# Patient Record
Sex: FEMALE | Race: OTHER | Employment: UNEMPLOYED | URBAN - METROPOLITAN AREA
[De-identification: names, ages, dates, MRNs, and addresses within clinical notes are randomized per-mention and may not be internally consistent; named-entity substitution may affect disease eponyms.]

---

## 2017-02-27 ENCOUNTER — HOSPITAL ENCOUNTER (OUTPATIENT)
Dept: MAMMOGRAPHY | Age: 37
Discharge: HOME | End: 2017-02-27
Attending: INTERNAL MEDICINE
Payer: SUBSIDIZED

## 2017-02-27 DIAGNOSIS — N63.0 BREAST NODULE: ICD-10-CM

## 2017-02-27 PROCEDURE — 77066 DX MAMMO INCL CAD BI: CPT

## 2017-02-27 PROCEDURE — 76642 ULTRASOUND BREAST LIMITED: CPT

## 2017-11-03 ENCOUNTER — HOSPITAL ENCOUNTER (OUTPATIENT)
Dept: LAB | Age: 37
Discharge: HOME | End: 2017-11-03

## 2017-11-03 PROCEDURE — 88175 CYTOPATH C/V AUTO FLUID REDO: CPT | Performed by: INTERNAL MEDICINE

## 2017-11-03 PROCEDURE — 87624 HPV HI-RISK TYP POOLED RSLT: CPT | Performed by: INTERNAL MEDICINE

## 2017-11-08 ENCOUNTER — HOSPITAL ENCOUNTER (OUTPATIENT)
Dept: ULTRASOUND IMAGING | Age: 37
Discharge: HOME | End: 2017-11-08
Payer: SUBSIDIZED

## 2017-11-08 DIAGNOSIS — R10.32 LEFT LOWER QUADRANT PAIN: ICD-10-CM

## 2017-11-08 PROCEDURE — 76856 US EXAM PELVIC COMPLETE: CPT

## 2017-11-08 PROCEDURE — 76830 TRANSVAGINAL US NON-OB: CPT

## 2019-05-15 ENCOUNTER — HOSPITAL ENCOUNTER (OUTPATIENT)
Dept: LAB | Age: 39
Discharge: HOME | End: 2019-05-15

## 2019-05-15 PROCEDURE — 80053 COMPREHEN METABOLIC PANEL: CPT

## 2019-05-15 PROCEDURE — 85025 COMPLETE CBC W/AUTO DIFF WBC: CPT

## 2019-05-15 PROCEDURE — 80061 LIPID PANEL: CPT

## 2019-05-16 LAB
ALBUMIN SERPL-MCNC: 4.1 G/DL (ref 3.5–5)
ALBUMIN/GLOB SERPL: 1.1 {RATIO} (ref 1.1–2.2)
ALP SERPL-CCNC: 63 U/L (ref 45–117)
ALT SERPL-CCNC: 40 U/L (ref 12–78)
ANION GAP SERPL CALC-SCNC: 8 MMOL/L (ref 5–15)
AST SERPL-CCNC: 29 U/L (ref 15–37)
BASOPHILS # BLD: 0.1 K/UL (ref 0–0.1)
BASOPHILS NFR BLD: 1 % (ref 0–1)
BILIRUB SERPL-MCNC: 0.5 MG/DL (ref 0.2–1)
BUN SERPL-MCNC: 10 MG/DL (ref 6–20)
BUN/CREAT SERPL: 15 (ref 12–20)
CALCIUM SERPL-MCNC: 9 MG/DL (ref 8.5–10.1)
CHLORIDE SERPL-SCNC: 104 MMOL/L (ref 97–108)
CHOLEST SERPL-MCNC: 233 MG/DL
CO2 SERPL-SCNC: 26 MMOL/L (ref 21–32)
CREAT SERPL-MCNC: 0.68 MG/DL (ref 0.55–1.02)
DIFFERENTIAL METHOD BLD: NORMAL
EOSINOPHIL # BLD: 0.2 K/UL (ref 0–0.4)
EOSINOPHIL NFR BLD: 3 % (ref 0–7)
ERYTHROCYTE [DISTWIDTH] IN BLOOD BY AUTOMATED COUNT: 12.8 % (ref 11.5–14.5)
GLOBULIN SER CALC-MCNC: 3.8 G/DL (ref 2–4)
GLUCOSE SERPL-MCNC: 92 MG/DL (ref 65–100)
HCT VFR BLD AUTO: 41.3 % (ref 35–47)
HDLC SERPL-MCNC: 54 MG/DL
HDLC SERPL: 4.3 {RATIO} (ref 0–5)
HGB BLD-MCNC: 13.5 G/DL (ref 11.5–16)
IMM GRANULOCYTES # BLD AUTO: 0 K/UL (ref 0–0.04)
IMM GRANULOCYTES NFR BLD AUTO: 0 % (ref 0–0.5)
LDLC SERPL CALC-MCNC: 146.2 MG/DL (ref 0–100)
LIPID PROFILE,FLP: ABNORMAL
LYMPHOCYTES # BLD: 2.4 K/UL (ref 0.8–3.5)
LYMPHOCYTES NFR BLD: 33 % (ref 12–49)
MCH RBC QN AUTO: 31.5 PG (ref 26–34)
MCHC RBC AUTO-ENTMCNC: 32.7 G/DL (ref 30–36.5)
MCV RBC AUTO: 96.5 FL (ref 80–99)
MONOCYTES # BLD: 0.6 K/UL (ref 0–1)
MONOCYTES NFR BLD: 8 % (ref 5–13)
NEUTS SEG # BLD: 4 K/UL (ref 1.8–8)
NEUTS SEG NFR BLD: 55 % (ref 32–75)
NRBC # BLD: 0 K/UL (ref 0–0.01)
NRBC BLD-RTO: 0 PER 100 WBC
PLATELET # BLD AUTO: 274 K/UL (ref 150–400)
PMV BLD AUTO: 9.8 FL (ref 8.9–12.9)
POTASSIUM SERPL-SCNC: 4 MMOL/L (ref 3.5–5.1)
PROT SERPL-MCNC: 7.9 G/DL (ref 6.4–8.2)
RBC # BLD AUTO: 4.28 M/UL (ref 3.8–5.2)
SODIUM SERPL-SCNC: 138 MMOL/L (ref 136–145)
TRIGL SERPL-MCNC: 164 MG/DL (ref ?–150)
VLDLC SERPL CALC-MCNC: 32.8 MG/DL
WBC # BLD AUTO: 7.3 K/UL (ref 3.6–11)

## 2021-11-16 ENCOUNTER — HOSPITAL ENCOUNTER (OUTPATIENT)
Age: 41
Setting detail: OBSERVATION
LOS: 1 days | Discharge: HOME | End: 2021-11-17
Attending: EMERGENCY MEDICINE | Admitting: FAMILY MEDICINE

## 2021-11-16 DIAGNOSIS — K85.90 ACUTE PANCREATITIS, UNSPECIFIED COMPLICATION STATUS, UNSPECIFIED PANCREATITIS TYPE: Primary | ICD-10-CM

## 2021-11-16 LAB
ALBUMIN SERPL-MCNC: 4 G/DL (ref 3.5–5)
ALBUMIN/GLOB SERPL: 1 {RATIO} (ref 1.1–2.2)
ALP SERPL-CCNC: 84 U/L (ref 45–117)
ALT SERPL-CCNC: 69 U/L (ref 12–78)
ANION GAP SERPL CALC-SCNC: 10 MMOL/L (ref 5–15)
AST SERPL-CCNC: 70 U/L (ref 15–37)
BASOPHILS # BLD: 0.1 K/UL (ref 0–0.1)
BASOPHILS NFR BLD: 0 % (ref 0–1)
BILIRUB SERPL-MCNC: 2.2 MG/DL (ref 0.2–1)
BUN SERPL-MCNC: 6 MG/DL (ref 6–20)
BUN/CREAT SERPL: 9 (ref 12–20)
CALCIUM SERPL-MCNC: 9.8 MG/DL (ref 8.5–10.1)
CHLORIDE SERPL-SCNC: 100 MMOL/L (ref 97–108)
CO2 SERPL-SCNC: 23 MMOL/L (ref 21–32)
COMMENT, HOLDF: NORMAL
CREAT SERPL-MCNC: 0.68 MG/DL (ref 0.55–1.02)
D DIMER PPP FEU-MCNC: 7.23 MG/L FEU (ref 0–0.65)
DIFFERENTIAL METHOD BLD: ABNORMAL
EOSINOPHIL # BLD: 0.3 K/UL (ref 0–0.4)
EOSINOPHIL NFR BLD: 2 % (ref 0–7)
ERYTHROCYTE [DISTWIDTH] IN BLOOD BY AUTOMATED COUNT: 12.7 % (ref 11.5–14.5)
GLOBULIN SER CALC-MCNC: 4.2 G/DL (ref 2–4)
GLUCOSE SERPL-MCNC: 141 MG/DL (ref 65–100)
HCT VFR BLD AUTO: 40.2 % (ref 35–47)
HGB BLD-MCNC: 14.2 G/DL (ref 11.5–16)
IMM GRANULOCYTES # BLD AUTO: 0.1 K/UL (ref 0–0.04)
IMM GRANULOCYTES NFR BLD AUTO: 0 % (ref 0–0.5)
LIPASE SERPL-CCNC: 1098 U/L (ref 73–393)
LYMPHOCYTES # BLD: 1.6 K/UL (ref 0.8–3.5)
LYMPHOCYTES NFR BLD: 10 % (ref 12–49)
MCH RBC QN AUTO: 34.2 PG (ref 26–34)
MCHC RBC AUTO-ENTMCNC: 35.3 G/DL (ref 30–36.5)
MCV RBC AUTO: 96.9 FL (ref 80–99)
MONOCYTES # BLD: 1 K/UL (ref 0–1)
MONOCYTES NFR BLD: 6 % (ref 5–13)
NEUTS SEG # BLD: 13.8 K/UL (ref 1.8–8)
NEUTS SEG NFR BLD: 82 % (ref 32–75)
NRBC # BLD: 0 K/UL (ref 0–0.01)
NRBC BLD-RTO: 0 PER 100 WBC
PLATELET # BLD AUTO: 229 K/UL (ref 150–400)
PMV BLD AUTO: 9.6 FL (ref 8.9–12.9)
POTASSIUM SERPL-SCNC: 3.2 MMOL/L (ref 3.5–5.1)
PROT SERPL-MCNC: 8.2 G/DL (ref 6.4–8.2)
RBC # BLD AUTO: 4.15 M/UL (ref 3.8–5.2)
SAMPLES BEING HELD,HOLD: NORMAL
SODIUM SERPL-SCNC: 133 MMOL/L (ref 136–145)
UR CULT HOLD, URHOLD: NORMAL
WBC # BLD AUTO: 16.8 K/UL (ref 3.6–11)

## 2021-11-16 PROCEDURE — 80061 LIPID PANEL: CPT

## 2021-11-16 PROCEDURE — 93005 ELECTROCARDIOGRAM TRACING: CPT

## 2021-11-16 PROCEDURE — 96360 HYDRATION IV INFUSION INIT: CPT

## 2021-11-16 PROCEDURE — 99284 EMERGENCY DEPT VISIT MOD MDM: CPT

## 2021-11-16 PROCEDURE — 83690 ASSAY OF LIPASE: CPT

## 2021-11-16 PROCEDURE — 36415 COLL VENOUS BLD VENIPUNCTURE: CPT

## 2021-11-16 PROCEDURE — 96365 THER/PROPH/DIAG IV INF INIT: CPT

## 2021-11-16 PROCEDURE — 85379 FIBRIN DEGRADATION QUANT: CPT

## 2021-11-16 PROCEDURE — 84484 ASSAY OF TROPONIN QUANT: CPT

## 2021-11-16 PROCEDURE — 96361 HYDRATE IV INFUSION ADD-ON: CPT

## 2021-11-16 PROCEDURE — 83735 ASSAY OF MAGNESIUM: CPT

## 2021-11-16 PROCEDURE — 96376 TX/PRO/DX INJ SAME DRUG ADON: CPT

## 2021-11-16 PROCEDURE — 85025 COMPLETE CBC W/AUTO DIFF WBC: CPT

## 2021-11-16 PROCEDURE — 80053 COMPREHEN METABOLIC PANEL: CPT

## 2021-11-16 PROCEDURE — 81001 URINALYSIS AUTO W/SCOPE: CPT

## 2021-11-16 PROCEDURE — 96372 THER/PROPH/DIAG INJ SC/IM: CPT

## 2021-11-16 RX ORDER — KETOROLAC TROMETHAMINE 30 MG/ML
15 INJECTION, SOLUTION INTRAMUSCULAR; INTRAVENOUS
Status: ACTIVE | OUTPATIENT
Start: 2021-11-16 | End: 2021-11-17

## 2021-11-16 NOTE — Clinical Note
Status[de-identified] INPATIENT [101]   Type of Bed: Medical [8]   Cardiac Monitoring Required?: No   Inpatient Hospitalization Certified Necessary for the Following Reasons: 3. Patient receiving treatment that can only be provided in an inpatient setting (further clarification in H&P documentation)   Admitting Diagnosis: Acute pancreatitis [577. 0. ICD-9-CM]   Admitting Physician: Herb Fung [[id number]]   Attending Physician: Herb Fung [[id number]]   Estimated Length of Stay: 2 Midnights   Discharge Plan[de-identified] Home with Office Follow-up

## 2021-11-17 ENCOUNTER — APPOINTMENT (OUTPATIENT)
Dept: MRI IMAGING | Age: 41
End: 2021-11-17
Attending: NURSE PRACTITIONER

## 2021-11-17 ENCOUNTER — APPOINTMENT (OUTPATIENT)
Dept: CT IMAGING | Age: 41
End: 2021-11-17
Attending: PHYSICIAN ASSISTANT

## 2021-11-17 VITALS
DIASTOLIC BLOOD PRESSURE: 72 MMHG | HEART RATE: 76 BPM | RESPIRATION RATE: 16 BRPM | SYSTOLIC BLOOD PRESSURE: 110 MMHG | TEMPERATURE: 98.1 F | OXYGEN SATURATION: 99 %

## 2021-11-17 PROBLEM — K85.90 ACUTE PANCREATITIS: Status: ACTIVE | Noted: 2021-11-17

## 2021-11-17 PROBLEM — K85.90 PANCREATITIS: Status: ACTIVE | Noted: 2021-11-17

## 2021-11-17 LAB
APPEARANCE UR: CLEAR
BACTERIA URNS QL MICRO: NEGATIVE /HPF
BILIRUB UR QL CFM: NEGATIVE
CHOLEST SERPL-MCNC: 270 MG/DL
COLOR UR: ABNORMAL
EPITH CASTS URNS QL MICRO: ABNORMAL /LPF
GLUCOSE UR STRIP.AUTO-MCNC: NEGATIVE MG/DL
HDLC SERPL-MCNC: 45 MG/DL
HDLC SERPL: 6 {RATIO} (ref 0–5)
HGB UR QL STRIP: ABNORMAL
HYALINE CASTS URNS QL MICRO: ABNORMAL /LPF (ref 0–5)
KETONES UR QL STRIP.AUTO: ABNORMAL MG/DL
LDLC SERPL CALC-MCNC: 195.6 MG/DL (ref 0–100)
LEUKOCYTE ESTERASE UR QL STRIP.AUTO: NEGATIVE
MAGNESIUM SERPL-MCNC: 2.3 MG/DL (ref 1.6–2.4)
NITRITE UR QL STRIP.AUTO: NEGATIVE
PH UR STRIP: 5.5 [PH] (ref 5–8)
PROT UR STRIP-MCNC: 30 MG/DL
RBC #/AREA URNS HPF: ABNORMAL /HPF (ref 0–5)
SP GR UR REFRACTOMETRY: 1.01 (ref 1–1.03)
TRIGL SERPL-MCNC: 147 MG/DL (ref ?–150)
TROPONIN-HIGH SENSITIVITY: 10 NG/L (ref 0–51)
UROBILINOGEN UR QL STRIP.AUTO: 1 EU/DL (ref 0.2–1)
VLDLC SERPL CALC-MCNC: 29.4 MG/DL
WBC URNS QL MICRO: ABNORMAL /HPF (ref 0–4)

## 2021-11-17 PROCEDURE — A9576 INJ PROHANCE MULTIPACK: HCPCS

## 2021-11-17 PROCEDURE — 96361 HYDRATE IV INFUSION ADD-ON: CPT

## 2021-11-17 PROCEDURE — 74177 CT ABD & PELVIS W/CONTRAST: CPT

## 2021-11-17 PROCEDURE — 74183 MRI ABD W/O CNTR FLWD CNTR: CPT

## 2021-11-17 PROCEDURE — G0378 HOSPITAL OBSERVATION PER HR: HCPCS

## 2021-11-17 PROCEDURE — 71275 CT ANGIOGRAPHY CHEST: CPT

## 2021-11-17 PROCEDURE — 99024 POSTOP FOLLOW-UP VISIT: CPT | Performed by: SURGERY

## 2021-11-17 PROCEDURE — 96372 THER/PROPH/DIAG INJ SC/IM: CPT

## 2021-11-17 PROCEDURE — 74011250636 HC RX REV CODE- 250/636: Performed by: HOSPITALIST

## 2021-11-17 PROCEDURE — 96360 HYDRATION IV INFUSION INIT: CPT

## 2021-11-17 PROCEDURE — 74011250636 HC RX REV CODE- 250/636

## 2021-11-17 PROCEDURE — 74011250636 HC RX REV CODE- 250/636: Performed by: PHYSICIAN ASSISTANT

## 2021-11-17 PROCEDURE — 96376 TX/PRO/DX INJ SAME DRUG ADON: CPT

## 2021-11-17 PROCEDURE — 96365 THER/PROPH/DIAG IV INF INIT: CPT

## 2021-11-17 PROCEDURE — 74011000636 HC RX REV CODE- 636: Performed by: EMERGENCY MEDICINE

## 2021-11-17 PROCEDURE — 74011000258 HC RX REV CODE- 258: Performed by: FAMILY MEDICINE

## 2021-11-17 RX ORDER — ENOXAPARIN SODIUM 100 MG/ML
40 INJECTION SUBCUTANEOUS DAILY
Status: DISCONTINUED | OUTPATIENT
Start: 2021-11-17 | End: 2021-11-17 | Stop reason: HOSPADM

## 2021-11-17 RX ORDER — ACETAMINOPHEN 650 MG/1
650 SUPPOSITORY RECTAL
Status: DISCONTINUED | OUTPATIENT
Start: 2021-11-17 | End: 2021-11-17 | Stop reason: HOSPADM

## 2021-11-17 RX ORDER — POTASSIUM CHLORIDE 7.45 MG/ML
10 INJECTION INTRAVENOUS
Status: COMPLETED | OUTPATIENT
Start: 2021-11-17 | End: 2021-11-17

## 2021-11-17 RX ORDER — ONDANSETRON 2 MG/ML
4 INJECTION INTRAMUSCULAR; INTRAVENOUS
Status: DISCONTINUED | OUTPATIENT
Start: 2021-11-17 | End: 2021-11-17 | Stop reason: HOSPADM

## 2021-11-17 RX ORDER — ONDANSETRON 4 MG/1
4 TABLET, ORALLY DISINTEGRATING ORAL
Status: DISCONTINUED | OUTPATIENT
Start: 2021-11-17 | End: 2021-11-17 | Stop reason: HOSPADM

## 2021-11-17 RX ORDER — SODIUM CHLORIDE 0.9 % (FLUSH) 0.9 %
5-40 SYRINGE (ML) INJECTION AS NEEDED
Status: DISCONTINUED | OUTPATIENT
Start: 2021-11-17 | End: 2021-11-17 | Stop reason: HOSPADM

## 2021-11-17 RX ORDER — HYDROMORPHONE HYDROCHLORIDE 1 MG/ML
1 INJECTION, SOLUTION INTRAMUSCULAR; INTRAVENOUS; SUBCUTANEOUS
Status: DISCONTINUED | OUTPATIENT
Start: 2021-11-17 | End: 2021-11-17 | Stop reason: HOSPADM

## 2021-11-17 RX ORDER — AMOXICILLIN 250 MG
1 CAPSULE ORAL DAILY
Status: DISCONTINUED | OUTPATIENT
Start: 2021-11-17 | End: 2021-11-17 | Stop reason: HOSPADM

## 2021-11-17 RX ORDER — ACETAMINOPHEN 325 MG/1
650 TABLET ORAL
Status: DISCONTINUED | OUTPATIENT
Start: 2021-11-17 | End: 2021-11-17 | Stop reason: HOSPADM

## 2021-11-17 RX ORDER — SODIUM CHLORIDE 0.9 % (FLUSH) 0.9 %
5-40 SYRINGE (ML) INJECTION EVERY 8 HOURS
Status: DISCONTINUED | OUTPATIENT
Start: 2021-11-17 | End: 2021-11-17 | Stop reason: HOSPADM

## 2021-11-17 RX ORDER — POLYETHYLENE GLYCOL 3350 17 G/17G
17 POWDER, FOR SOLUTION ORAL DAILY PRN
Status: DISCONTINUED | OUTPATIENT
Start: 2021-11-17 | End: 2021-11-17 | Stop reason: HOSPADM

## 2021-11-17 RX ORDER — SODIUM CHLORIDE, SODIUM LACTATE, POTASSIUM CHLORIDE, CALCIUM CHLORIDE 600; 310; 30; 20 MG/100ML; MG/100ML; MG/100ML; MG/100ML
150 INJECTION, SOLUTION INTRAVENOUS CONTINUOUS
Status: DISCONTINUED | OUTPATIENT
Start: 2021-11-17 | End: 2021-11-17 | Stop reason: HOSPADM

## 2021-11-17 RX ORDER — SODIUM CHLORIDE 0.9 % (FLUSH) 0.9 %
10 SYRINGE (ML) INJECTION
Status: COMPLETED | OUTPATIENT
Start: 2021-11-17 | End: 2021-11-17

## 2021-11-17 RX ORDER — TRAMADOL HYDROCHLORIDE 50 MG/1
50 TABLET ORAL
Status: DISCONTINUED | OUTPATIENT
Start: 2021-11-17 | End: 2021-11-17 | Stop reason: HOSPADM

## 2021-11-17 RX ADMIN — SODIUM CHLORIDE 1000 ML: 9 INJECTION, SOLUTION INTRAVENOUS at 04:04

## 2021-11-17 RX ADMIN — IOPAMIDOL 100 ML: 755 INJECTION, SOLUTION INTRAVENOUS at 02:03

## 2021-11-17 RX ADMIN — POTASSIUM CHLORIDE 10 MEQ: 7.46 INJECTION, SOLUTION INTRAVENOUS at 07:36

## 2021-11-17 RX ADMIN — SODIUM CHLORIDE, POTASSIUM CHLORIDE, SODIUM LACTATE AND CALCIUM CHLORIDE 150 ML/HR: 600; 310; 30; 20 INJECTION, SOLUTION INTRAVENOUS at 04:06

## 2021-11-17 RX ADMIN — POTASSIUM CHLORIDE 10 MEQ: 7.46 INJECTION, SOLUTION INTRAVENOUS at 05:28

## 2021-11-17 RX ADMIN — Medication 10 ML: at 13:22

## 2021-11-17 RX ADMIN — POTASSIUM CHLORIDE 10 MEQ: 7.46 INJECTION, SOLUTION INTRAVENOUS at 06:30

## 2021-11-17 RX ADMIN — POTASSIUM CHLORIDE 10 MEQ: 7.46 INJECTION, SOLUTION INTRAVENOUS at 04:04

## 2021-11-17 RX ADMIN — SODIUM CHLORIDE 100 ML: 900 INJECTION, SOLUTION INTRAVENOUS at 13:22

## 2021-11-17 RX ADMIN — ENOXAPARIN SODIUM 40 MG: 100 INJECTION SUBCUTANEOUS at 12:22

## 2021-11-17 RX ADMIN — GADOTERIDOL 10 ML: 279.3 INJECTION, SOLUTION INTRAVENOUS at 13:45

## 2021-11-17 NOTE — PROGRESS NOTES
Gen surgery was called for work-up of pancreatitis but there were no gallstones on CT scan and no gallbladder inflammation, and apparently the attending physician had spoken with my nurse practitioner López Mariano and indicated that patient was going home, but it does not sound like this is a surgical issue sounds like she has recurring pancreatitis, reconsult general surgery if needed or patient hospitalized and need general surgery input. If concern of gallstone pancreatitis then would recommend ultrasound of the gallbladder and even if alcohol induced pancreatitis suspected, if gallstones are found, may benefit from cholecystectomy electively in the future to at least eliminate that as the potential source.   She can follow-up in the surgery clinic Dr. Roshan Farooq or other if needed

## 2021-11-17 NOTE — ED TRIAGE NOTES
Pt arrives from the [facility] c/o abd pain and distention that radiates to back and chest.  Pt reports pain started yesterday. Pt tried taking Tylenol which made her abd hurt more.

## 2021-11-17 NOTE — DISCHARGE SUMMARY
Discharge Summary       PATIENT ID: Maxi Campos  MRN: [id number]   YOB: 1980    DATE OF ADMISSION: 11/16/2021 11:26 PM    DATE OF DISCHARGE:11/17/2021  PRIMARY CARE PROVIDER: Filomena Ceballos MD     ATTENDING PHYSICIAN: Mukund Saeed MD  DISCHARGING PROVIDER: Florecita Goncalves NP    To contact this individual call [phone] and ask the  to page. If unavailable ask to be transferred the Adult Hospitalist Department. CONSULTATIONS: IP CONSULT TO GASTROENTEROLOGY  IP CONSULT TO HOSPITALIST  IP CONSULT TO GENERAL SURGERY    PROCEDURES/SURGERIES: * No surgery found *    ADMITTING DIAGNOSES & HOSPITAL COURSE:     HPI: 46 y.o lady who presents with abdominal pain. Symptoms began yesterday. She describes epigastric pain that radiates to her chest, no exacerbating or alleviating factors. Associated symptoms include nausea, vomiting, and bloating. No fever, diarrhea or constipation. She states she drinks 1-shot of tequila a day \"sometimes. \"  In the course of her time at our facility she was seen by gastroenterology. She underwent MRCP was consistent with pancreatitis, hepatic steatosis and cholelithiasis, no cholecystitis. Patient remained pain-free and was able to tolerate regular diet and fluids. He had not received analgesia.         DISCHARGE DIAGNOSES / PLAN:      Acute pancreatitis  Clear on alcohol consumption  Triglycerides unremarkable  No further abdominal pain, no nausea, no vomiting, able to tolerate oral intake without problem  Follow-up with PCP  Alcohol cessation       PENDING TEST RESULTS:   At the time of discharge the following test results are still pending:     FOLLOW UP APPOINTMENTS:    Follow-up Information     Follow up With Specialties Details Why Contact Info    Filomena Ceballos MD Internal Medicine, Infectious Disease   [address] [facility]  [address] [place] [address]  [phone]      Filomena Ceballos MD Internal Medicine, Infectious Disease In 3 days [address] [place]  [phone]             ADDITIONAL CARE RECOMMENDATIONS:     DIET: Low fat, Low cholesterol      ACTIVITY: Activity as tolerated    WOUND CARE: na    EQUIPMENT needed: na      DISCHARGE MEDICATIONS:  There are no discharge medications for this patient. NOTIFY YOUR PHYSICIAN FOR ANY OF THE FOLLOWING:   Fever over 101 degrees for 24 hours. Chest pain, shortness of breath, fever, chills, nausea, vomiting, diarrhea, change in mentation, falling, weakness, bleeding. Severe pain or pain not relieved by medications. Or, any other signs or symptoms that you may have questions about.     DISPOSITION:   x Home With:   OT  PT  HH  RN       Long term SNF/Inpatient Rehab    Independent/assisted living    Hospice    Other:       PATIENT CONDITION AT DISCHARGE:     Functional status    Poor     Deconditioned    x Independent      Cognition    x Lucid     Forgetful     Dementia      Catheters/lines (plus indication)    Clancy     PICC     PEG    x None      Code status    x Full code     DNR      PHYSICAL EXAMINATION AT DISCHARGE:   Refer to Progress Note      CHRONIC MEDICAL DIAGNOSES:  Problem List as of 11/17/2021 Never Reviewed          Codes Class Noted - Resolved    Acute pancreatitis ICD-10-CM: K85.90  ICD-9-CM: 043.1  11/17/2021 - Present        Pancreatitis ICD-10-CM: K85.90  ICD-9-CM: 032.6  11/17/2021 - Present              Greater than 30minutes were spent with the patient on counseling and coordination of care    Signed:   Ana Rosa Hopkins NP  11/17/2021  5:38 PM

## 2021-11-17 NOTE — CONSULTS
[address] [place] [id number]        GASTROENTEROLOGY CONSULTATION NOTE  [place]elia [place], [address] [facility] office  [phone] NP in-hospital cell phone M-F until 4:30  After 5pm or on weekends, please call  for physician on call        NAME:  Supriya Garvey   :   1980   MRN:   [id number]       Referring Physician: Whitfield Dandy    Consult Date: 2021 11:22 AM    Chief Complaint: pancreatitis     History of Present Illness:  Patient is a 46 y.o. who is seen in consultation at the request of Dr. Whitfield Dandy for pancreatitis. Medical history as listed below. She presented for severe abdominal pain. She denies nausea or constipation. Her pain is better now. Denies new medications or tobacco.  She is somewhat vague about her alcohol use, initially saying she drinks tequila to help with a tickle in her throat, then says she drinks some beer, later says she drinks some wine. I have reviewed the emergency room note, hospital admission note, notes by all other clinicians who have seen the patient during this hospitalization to date. I have reviewed the problem list and the reason for this hospitalization. I have reviewed the allergies and the medications the patient was taking at home prior to this hospitalization. PMH:  Past Medical History:   Diagnosis Date    Breast lump 2017    Right breast       PSH:  History reviewed. No pertinent surgical history. Allergies:   Allergies   Allergen Reactions    Other Plant, Animal, Environmental Hoarseness     Chlorox         Home Medications:  None       Hospital Medications:  Current Facility-Administered Medications   Medication Dose Route Frequency    sodium chloride (NS) flush 5-40 mL  5-40 mL IntraVENous Q8H    sodium chloride (NS) flush 5-40 mL  5-40 mL IntraVENous PRN    acetaminophen (TYLENOL) tablet 650 mg  650 mg Oral Q6H PRN    Or    acetaminophen (TYLENOL) suppository 650 mg 650 mg Rectal Q6H PRN    polyethylene glycol (MIRALAX) packet 17 g  17 g Oral DAILY PRN    ondansetron (ZOFRAN ODT) tablet 4 mg  4 mg Oral Q8H PRN    Or    ondansetron (ZOFRAN) injection 4 mg  4 mg IntraVENous Q6H PRN    enoxaparin (LOVENOX) injection 40 mg  40 mg SubCUTAneous DAILY    lactated Ringers infusion  150 mL/hr IntraVENous CONTINUOUS    traMADoL (ULTRAM) tablet 50 mg  50 mg Oral Q6H PRN    HYDROmorphone (DILAUDID) injection 1 mg  1 mg IntraVENous Q3H PRN    senna-docusate (PERICOLACE) 8.6-50 mg per tablet 1 Tablet  1 Tablet Oral DAILY     No current outpatient medications on file. Social History:  Social History     Tobacco Use    Smoking status: Not on file    Smokeless tobacco: Not on file   Substance Use Topics    Alcohol use: Not on file       Family History:  History reviewed. No pertinent family history. Review of Systems:  Constitutional: negative fever, negative chills, negative weight loss  Eyes:   negative visual changes  ENT:   negative sore throat, tongue or lip swelling  Respiratory:  negative cough, negative dyspnea  Cards:  negative for chest pain, palpitations, lower extremity edema  GI:   See HPI  :  negative for frequency, dysuria  Integument:  negative for rash and pruritus  Heme:  negative for easy bruising and gum/nose bleeding  Musculoskeletal:negative for myalgias, back pain and muscle weakness  Neuro:  negative for headaches, dizziness, vertigo  Psych: negative for feelings of anxiety, depression     Objective:   No data found. No intake/output data recorded. No intake/output data recorded.     EXAM:     CONST:  Pleasant lady lying in bed, no acute distress   NEURO:  alert and oriented x 3   HEENT: EOMI, no scleral icterus   LUNGS: No increased WOB   CARD:  Regular rate   ABD:  soft, no tenderness, no rebound, no masses, non distended   EXT:  no edema, warm   PSYCH: full, not anxious     Data Review     Recent Labs     11/16/21  2308   WBC 16.8*   HGB 14.2 HCT 40.2        Recent Labs     11/16/21  2308   *   K 3.2*      CO2 23   BUN 6   CREA 0.68   *   CA 9.8     Recent Labs     11/16/21  2308   AP 84   TP 8.2   ALB 4.0   GLOB 4.2*   LPSE 1,098*     No results for input(s): INR, PTP, APTT, INREXT in the last 72 hours. IMPRESSION  1. No evidence of acute pulmonary embolus. 2. Severe diffuse hepatic steatosis. 3. Inflammatory changes around the pancreatic head and in the right upper  quadrant mesentery likely represent acute pancreatitis. No associated abscess or  pseudocyst.  4. Moderate pelvic free fluid.   Assessment:     · Pancreatitis: CT as above, lipase 1,098; AST 70, t bili 2.2; triglycerides okay   · Leukocytosis      Patient Active Problem List   Diagnosis Code    Acute pancreatitis K85.90     Plan:     · NPO  · MRI/MRCP  · Supportive care - IVF, pain control  · Monitor LFT's  · Patient discussed with Dr. Keyanna Bojorquez  · Thank you for allowing me to participate in care of [address]     Signed By: Bouchra Ladd NP     11/17/2021  11:22 AM       Agree with above  MRCP= gallstones and pancreatitis  Suspect gallstones are cause, surgery consulted

## 2021-11-17 NOTE — DISCHARGE INSTRUCTIONS
Patient Education        Pancreatitis: Instrucciones de cuidado  Pancreatitis: Care Instructions  Generalidades     El páncreas es un órgano que se encuentra detrás del [facility]. Produce hormonas y enzimas para ayudar al cuerpo a digerir los alimentos. [place] si estas enzimas atacan al páncreas, glen puede inflamarse. [place] se conoce bubba pancreatitis. La mayoría de los casos están causados por cálculos biliares o por el uso excesivo del alcohol. Si se cuida en el hogar, puede ayudarle a mejorarse. También le ayudará a evitar más problemas con malone páncreas. ¿Cómo puede cuidarse en el hogar? · Nadira líquidos brayan y coma alimentos suaves hasta que se encuentre mejor. Los alimentos suaves incluyen el arroz, el pan raúl seco y las galletas saladas. Además incluyen bananas (plátanos) y puré de Synchari. · Siga sandra dieta baja en grasas hasta que malone médico le diga que malone páncreas ha sanado. · No nadira alcohol. Avísele a malone médico si necesita ayuda para dejarlo. La asesoría psicológica, los grupos de apoyo y, a veces, algunos medicamentos pueden ayudarle a mantenerse sobrio. · Sea maria teresa con los medicamentos. Tatyana y siga todas las instrucciones de la [place]. ? Si el médico le recetó un analgésico (medicamento para el dolor), tómelo según las indicaciones. ? Si no está tomando un analgésico recetado, pregúntele a malone médico si puede bashir tomy de [employer]. · Si malone médico le recetó antibióticos, tómelos según las indicaciones. No deje de tomarlos por el hecho de sentirse mejor. Debe bashir todos los antibióticos hasta terminarlos. · Descanse más de lo habitual hasta que se sienta mejor. Para prevenir futuros problemas con malone páncreas  · No nadira alcohol. · Informe a atul médicos y a malone farmacéutico de que ha tenido pancreatitis. Pueden ayudarle a evitar medicamentos que pueden volver a causar glen problema. ¿Dónde puede encontrar más información en inglés?   Soundra [place] a http://www.gray.com/  Kisha O388 en la búsqueda para aprender más acerca de \"Pancreatitis: Instrucciones de cuidado. \"  Revisado: 10 febrero, 2021               Versión del contenido: 13.0  © 9043-0782 Healthwise, Incorporated. Las instrucciones de cuidado fueron adaptadas bajo licencia por [employer] (which disclaims liability or warranty for this information). Si usted tiene [place] afección médica o sobre estas instrucciones, siempre pregunte a malone profesional de jadyn. Healthwise, Incorporated niega toda garantía o responsabilidad por malone uso de esta información. Discharge Instructions       PATIENT ID: Randal Demarco  MRN: [id number]   YOB: 1980    DATE OF ADMISSION: 11/16/2021 11:26 PM    DATE OF DISCHARGE: 11/17/2021    PRIMARY CARE PROVIDER: Lety Delgadillo MD     ATTENDING PHYSICIAN: Jessika Hall MD  DISCHARGING PROVIDER: Mark Ann NP    To contact this individual call [phone] and ask the  to page. If unavailable ask to be transferred the Adult Hospitalist Department.     DISCHARGE DIAGNOSES pancreatitis    CONSULTATIONS: IP CONSULT TO GASTROENTEROLOGY  IP CONSULT TO HOSPITALIST  IP CONSULT TO GENERAL SURGERY    PROCEDURES/SURGERIES: * No surgery found *    PENDING TEST RESULTS:   At the time of discharge the following test results are still pending:     FOLLOW UP APPOINTMENTS:   Follow-up Information     Follow up With Specialties Details Why Contact Info    Lety Delgadillo MD Internal Medicine, Infectious Disease   [address]  [address] [address] [facility]  [phone]      Lety Delgadillo MD Internal Medicine, Infectious Disease In 3 days  [address]  [address]  [phone]             ADDITIONAL CARE RECOMMENDATIONS:     DIET: Low fat, Low cholesterol    ACTIVITY: Activity as tolerated    WOUND CARE: na    EQUIPMENT needed: na      DISCHARGE MEDICATIONS:   See Medication Reconciliation Form    · It is important that you take the medication exactly as they are prescribed. · Keep your medication in the bottles provided by the pharmacist and keep a list of the medication names, dosages, and times to be taken in your wallet. · Do not take other medications without consulting your doctor. NOTIFY YOUR PHYSICIAN FOR ANY OF THE FOLLOWING:   Fever over 101 degrees for 24 hours. Chest pain, shortness of breath, fever, chills, nausea, vomiting, diarrhea, change in mentation, falling, weakness, bleeding. Severe pain or pain not relieved by medications. Or, any other signs or symptoms that you may have questions about.       DISPOSITION:   x Home With:   OT  PT  [place]  RN       SNF/Inpatient Rehab/LTAC    Independent/assisted living    Hospice    Other:         Signed:   Mehdi Landeros NP  11/17/2021  5:36 PM

## 2021-11-17 NOTE — H&P
HISTORY AND PHYSICAL      PCP: Gilbert Hutchinson MD  History source: the patient      CC: abdominal pain      HPI: 46 y.o lady who presents with abdominal pain. Symptoms began yesterday. She describes epigastric pain that radiates to her chest, no exacerbating or alleviating factors. Associated symptoms include nausea, vomiting, and bloating. No fever, diarrhea or constipation. She states she drinks 1-shot of tequila a day \"sometimes. \"      PMH/PSH:  Past Medical History:   Diagnosis Date    Breast lump 01/2017    Right breast     History reviewed. No pertinent surgical history. Home meds:   Prior to Admission medications    Not on File       Allergies: Allergies   Allergen Reactions    Other Plant, Animal, Environmental Hoarseness     Chlorox         FH:  History reviewed. No pertinent family history. SH:  Social History     Tobacco Use    Smoking status: Not on file    Smokeless tobacco: Not on file   Substance Use Topics    Alcohol use: Not on file       ROS: A comprehensive review of systems was negative except for that written in the HPI.       PHYSICAL EXAM:  Visit Vitals  BP (!) 152/74   Pulse (!) 110   Temp 98.1 °F (36.7 °C)   Resp 18   SpO2 98%       Gen: NAD, non-toxic  HEENT: anicteric sclerae, normal conjunctiva, oropharynx clear, MM moist  Neck: supple, trachea midline, no adenopathy  Heart: RRR, no MRG, no JVD, no peripheral edema  Lungs: CTA b/l, non-labored respirations  Abd: soft, mild epigastric tenderness, ND, BS+  Extr: warm  Skin: dry, no rash  Neuro: CN II-XII grossly intact, normal speech, moves all extremities  Psych: normal mood, appropriate affect      Labs/Imaging:  Recent Results (from the past 24 hour(s))   EKG, 12 LEAD, INITIAL    Collection Time: 11/16/21 10:55 PM   Result Value Ref Range    Ventricular Rate 110 BPM    Atrial Rate 110 BPM    P-R Interval 176 ms    QRS Duration 78 ms    Q-T Interval 338 ms    QTC Calculation (Bezet) 457 ms    Calculated P Axis 65 degrees Calculated R Axis 103 degrees    Calculated T Axis 31 degrees    Diagnosis       Sinus tachycardia  Rightward axis  ST & T wave abnormality, consider inferior ischemia  ST & T wave abnormality, consider anterior ischemia  Abnormal ECG  No previous ECGs available     CBC WITH AUTOMATED DIFF    Collection Time: 11/16/21 11:08 PM   Result Value Ref Range    WBC 16.8 (H) 3.6 - 11.0 K/uL    RBC 4.15 3.80 - 5.20 M/uL    HGB 14.2 11.5 - 16.0 g/dL    HCT 40.2 35.0 - 47.0 %    MCV 96.9 80.0 - 99.0 FL    MCH 34.2 (H) 26.0 - 34.0 PG    MCHC 35.3 30.0 - 36.5 g/dL    RDW 12.7 11.5 - 14.5 %    PLATELET 570 905 - 979 K/uL    MPV 9.6 8.9 - 12.9 FL    NRBC 0.0 0  WBC    ABSOLUTE NRBC 0.00 0.00 - 0.01 K/uL    NEUTROPHILS 82 (H) 32 - 75 %    LYMPHOCYTES 10 (L) 12 - 49 %    MONOCYTES 6 5 - 13 %    EOSINOPHILS 2 0 - 7 %    BASOPHILS 0 0 - 1 %    IMMATURE GRANULOCYTES 0 0.0 - 0.5 %    ABS. NEUTROPHILS 13.8 (H) 1.8 - 8.0 K/UL    ABS. LYMPHOCYTES 1.6 0.8 - 3.5 K/UL    ABS. MONOCYTES 1.0 0.0 - 1.0 K/UL    ABS. EOSINOPHILS 0.3 0.0 - 0.4 K/UL    ABS. BASOPHILS 0.1 0.0 - 0.1 K/UL    ABS. IMM. GRANS. 0.1 (H) 0.00 - 0.04 K/UL    DF AUTOMATED     METABOLIC PANEL, COMPREHENSIVE    Collection Time: 11/16/21 11:08 PM   Result Value Ref Range    Sodium 133 (L) 136 - 145 mmol/L    Potassium 3.2 (L) 3.5 - 5.1 mmol/L    Chloride 100 97 - 108 mmol/L    CO2 23 21 - 32 mmol/L    Anion gap 10 5 - 15 mmol/L    Glucose 141 (H) 65 - 100 mg/dL    BUN 6 6 - 20 MG/DL    Creatinine 0.68 0.55 - 1.02 MG/DL    BUN/Creatinine ratio 9 (L) 12 - 20      GFR est AA >60 >60 ml/min/1.73m2    GFR est non-AA >60 >60 ml/min/1.73m2    Calcium 9.8 8.5 - 10.1 MG/DL    Bilirubin, total 2.2 (H) 0.2 - 1.0 MG/DL    ALT (SGPT) 69 12 - 78 U/L    AST (SGOT) 70 (H) 15 - 37 U/L    Alk.  phosphatase 84 45 - 117 U/L    Protein, total 8.2 6.4 - 8.2 g/dL    Albumin 4.0 3.5 - 5.0 g/dL    Globulin 4.2 (H) 2.0 - 4.0 g/dL    A-G Ratio 1.0 (L) 1.1 - 2.2     SAMPLES BEING HELD Collection Time: 11/16/21 11:08 PM   Result Value Ref Range    SAMPLES BEING HELD 1RED     COMMENT        Add-on orders for these samples will be processed based on acceptable specimen integrity and analyte stability, which may vary by analyte. LIPASE    Collection Time: 11/16/21 11:08 PM   Result Value Ref Range    Lipase 1,098 (H) 73 - 393 U/L   D DIMER    Collection Time: 11/16/21 11:08 PM   Result Value Ref Range    D-dimer 7.23 (H) 0.00 - 0.65 mg/L FEU   TROPONIN-HIGH SENSITIVITY    Collection Time: 11/16/21 11:08 PM   Result Value Ref Range    Troponin-High Sensitivity 10 0 - 51 ng/L   URINALYSIS W/MICROSCOPIC    Collection Time: 11/16/21 11:42 PM   Result Value Ref Range    Color DARK YELLOW      Appearance CLEAR CLEAR      Specific gravity 1.013 1.003 - 1.030      pH (UA) 5.5 5.0 - 8.0      Protein 30 (A) NEG mg/dL    Glucose Negative NEG mg/dL    Ketone TRACE (A) NEG mg/dL    Blood SMALL (A) NEG      Urobilinogen 1.0 0.2 - 1.0 EU/dL    Nitrites Negative NEG      Leukocyte Esterase Negative NEG      WBC 0-4 0 - 4 /hpf    RBC 10-20 0 - 5 /hpf    Epithelial cells FEW FEW /lpf    Bacteria Negative NEG /hpf    Hyaline cast 0-2 0 - 5 /lpf   URINE CULTURE HOLD SAMPLE    Collection Time: 11/16/21 11:42 PM    Specimen: Serum; Urine   Result Value Ref Range    Urine culture hold        Urine on hold in Microbiology dept for 2 days. If unpreserved urine is submitted, it cannot be used for addtional testing after 24 hours, recollection will be required.    BILIRUBIN, CONFIRM    Collection Time: 11/16/21 11:42 PM   Result Value Ref Range    Bilirubin UA, confirm Negative         Recent Labs     11/16/21  2308   WBC 16.8*   HGB 14.2   HCT 40.2        Recent Labs     11/16/21  2308   *   K 3.2*      CO2 23   BUN 6   CREA 0.68   *   CA 9.8     Recent Labs     11/16/21  2308   ALT 69   AP 84   TBILI 2.2*   TP 8.2   ALB 4.0   GLOB 4.2*   LPSE 1,098*       No results for input(s): CPK, CKNDX, TROIQ in the last 72 hours. No lab exists for component: CPKMB    No results for input(s): INR, PTP, APTT, INREXT in the last 72 hours. No results for input(s): PH, PCO2, PO2 in the last 72 hours. CTA CHEST W OR W WO CONT    Result Date: 11/17/2021  1. No evidence of acute pulmonary embolus. 2. Severe diffuse hepatic steatosis. 3. Inflammatory changes around the pancreatic head and in the right upper quadrant mesentery likely represent acute pancreatitis. No associated abscess or pseudocyst. 4. Moderate pelvic free fluid. CT ABD PELV W CONT    Result Date: 11/17/2021  1. No evidence of acute pulmonary embolus. 2. Severe diffuse hepatic steatosis. 3. Inflammatory changes around the pancreatic head and in the right upper quadrant mesentery likely represent acute pancreatitis. No associated abscess or pseudocyst. 4. Moderate pelvic free fluid. Assessment & Plan:     Acute pancreatitis: no clear triggers. Possibly alcohol related.    -IV fluids  -pain control  -bowel rest  -check lipid panel  -consult GI    Mild hyponatremia due to volume depletion    Hypokalemia replenished, check serum Mg    DVT ppx: sq Lovenox  Code status: full  Disposition: home when ready    Signed By: Imelda Longoria MD     November 17, 2021

## 2021-11-17 NOTE — ED PROVIDER NOTES
46year old F presenting to the ED for multiple complaints. Pt reports abdominal pain and bloating. Pain started yesterday, constant, progressive. Pt notes that pain started in her upper back, then across her chest, now in her abdomen. Feels SOB. Pt notes that she cannot eat because her stomach feels full. Left sided chest pain is worse with a deep breath. Also worse with movement. Denies diarrhea. Has been constipated, no BM since yesterday. Notes that she has been drinking a lot of water but urinating less. Slight dysuria. When asked about pregnancy notes that she is unsure - LMP November 4th. No abnormal vaginal bleeding or discharge. No fever. No medicines taken at home. Some bilateral leg pain. No hemoptysis. PMHx: denies  PSx: denies  Social:  Denies tobacco or drug use. Occasional beer, maybe 2-3 a week. 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    [id number]           Past Medical History:   Diagnosis Date    Breast lump 01/2017    Right breast       History reviewed. No pertinent surgical history. History reviewed. No pertinent family history.     Social History     Socioeconomic History    Marital status:      Spouse name: Not on file    Number of children: Not on file    Years of education: Not on file    Highest education level: Not on file   Occupational History    Not on file   Tobacco Use    Smoking status: Not on file    Smokeless tobacco: Not on file   Substance and Sexual Activity    Alcohol use: Not on file    Drug use: Not on file    Sexual activity: Not on file   Other Topics Concern    Not on file   Social History Narrative    Not on file     Social Determinants of Health     Financial Resource Strain:     Difficulty of Paying Living Expenses: Not on file   Food Insecurity:     Worried About Running Out of Food in the Last Year: Not on file    Valery of Food in the Last Year: Not on file   Transportation Needs:     Lack of Transportation (Medical): Not on file    Lack of Transportation (Non-Medical): Not on file   Physical Activity:     Days of Exercise per Week: Not on file    Minutes of Exercise per Session: Not on file   Stress:     Feeling of Stress : Not on file   Social Connections:     Frequency of Communication with Friends and Family: Not on file    Frequency of Social Gatherings with Friends and Family: Not on file    Attends Sabianism Services: Not on file    Active Member of [address] [place] [address] or Organizations: Not on file    Attends Club or Organization Meetings: Not on file    Marital Status: Not on file   Intimate Partner Violence:     Fear of Current or Ex-Partner: Not on file    Emotionally Abused: Not on file    Physically Abused: Not on file    Sexually Abused: Not on file   Housing Stability:     Unable to Pay for Housing in the Last Year: Not on file    Number of [place] in the Last Year: Not on file    Unstable Housing in the Last Year: Not on file         ALLERGIES: Other plant, animal, environmental    Review of Systems   Constitutional: Negative for fever. HENT: Negative for facial swelling. Respiratory: Positive for shortness of breath. Cardiovascular: Positive for chest pain. Gastrointestinal: Positive for abdominal pain and constipation. Negative for vomiting. Genitourinary: Positive for decreased urine volume and flank pain. Musculoskeletal: Positive for back pain. Skin: Negative for wound. Neurological: Negative for syncope. All other systems reviewed and are negative. Vitals:    11/16/21 1923 11/16/21 2350   BP:  (!) 152/74   Pulse: (!) 110    Resp: 18    Temp: 98.1 °F (36.7 °C)    SpO2: 98%             Physical Exam  Vitals and nursing note reviewed. Constitutional:       Appearance: She is well-developed. Comments: pleasant  female, appears to not feel well, mild distress   HENT:      Head: Normocephalic and atraumatic.       Right Ear: External ear normal.      Left Ear: External ear normal.   Eyes:      General: No scleral icterus. Conjunctiva/sclera: Conjunctivae normal.   Neck:      Trachea: No tracheal deviation. Cardiovascular:      Rate and Rhythm: Normal rate and regular rhythm. Heart sounds: Normal heart sounds. No murmur heard. No friction rub. No gallop. Pulmonary:      Effort: Pulmonary effort is normal. No respiratory distress. Breath sounds: Normal breath sounds. No stridor. No wheezing. Abdominal:      General: There is no distension. Palpations: Abdomen is soft. Tenderness: There is generalized abdominal tenderness. Musculoskeletal:         General: Normal range of motion. Cervical back: Neck supple. Skin:     General: Skin is warm and dry. Neurological:      Mental Status: She is alert and oriented to person, place, and time. Psychiatric:         Behavior: Behavior normal.          MDM  Number of Diagnoses or Management Options  Acute pancreatitis, unspecified complication status, unspecified pancreatitis type  Diagnosis management comments: 72-year-old female presenting to the ED from the [facility] clinic for multiple complaints, started earlier this morning with back pain, then chest pain, now abdominal pain, feeling of bloating, constipation. Initially mildly tachycardic on arrival, appears somewhat uncomfortable, generalized abdominal tenderness on exam.  Given tachycardia, complaints of shortness of breath and pleuritic chest pain, dimer ordered which was elevated at 7. Given WBC 16.8, lipase 1000, generalized abdominal tenderness, CT scan of the abdomen/pelvis also ordered.        Amount and/or Complexity of Data Reviewed  Clinical lab tests: ordered and reviewed  Tests in the radiology section of CPT®: reviewed and ordered  Discuss the patient with other providers: yes (Dr. Razia Willard ED attending)           Procedures                   Perfect Serve Consult for Admission  2:29 AM    ED Room Number: R38/R38  Patient Name and age:  Guero Nevarez 46 y.o.  female  Working Diagnosis:   1. Acute pancreatitis, unspecified complication status, unspecified pancreatitis type        COVID-19 Suspicion:  no  Sepsis present:  no  Reassessment needed: no  Code Status:  Full Code  Readmission: no  Isolation Requirements:  no  Recommended Level of Care:  med/surg  Department:[facility] ED - 21   Other:  New onset today of chest/back/abd pain - abd distension, constipation - sent from the [address] [place] [address] - appears uncomfortable -WBC 16.8,  lipase 1,000, CT showing parapancreatic inflammation - no gallstones, minimal alcohol     #[id number] used to review results, reason for admission.   SUZIE Short  2:48 AM

## 2021-11-17 NOTE — PROGRESS NOTES
Patient was able to eat with no complication, no pain, no nausea, no vomiting. Patient being discharged to home. V/S within acceptable limits and I.V. removed. Patient educated and agreed to follow-up plan of care.

## 2021-11-18 LAB
ATRIAL RATE: 110 BPM
CALCULATED P AXIS, ECG09: 65 DEGREES
CALCULATED R AXIS, ECG10: 103 DEGREES
CALCULATED T AXIS, ECG11: 31 DEGREES
DIAGNOSIS, 93000: NORMAL
P-R INTERVAL, ECG05: 176 MS
Q-T INTERVAL, ECG07: 338 MS
QRS DURATION, ECG06: 78 MS
QTC CALCULATION (BEZET), ECG08: 457 MS
VENTRICULAR RATE, ECG03: 110 BPM

## 2021-12-30 ENCOUNTER — HOSPITAL ENCOUNTER (OUTPATIENT)
Dept: LAB | Age: 41
Discharge: HOME | End: 2021-12-30

## 2021-12-30 PROCEDURE — 80061 LIPID PANEL: CPT

## 2021-12-30 PROCEDURE — 83690 ASSAY OF LIPASE: CPT

## 2021-12-31 LAB
CHOLEST SERPL-MCNC: 256 MG/DL
HDLC SERPL-MCNC: 36 MG/DL
HDLC SERPL: 7.1 {RATIO} (ref 0–5)
LDLC SERPL CALC-MCNC: 151.4 MG/DL (ref 0–100)
LIPASE SERPL-CCNC: 180 U/L (ref 73–393)
TRIGL SERPL-MCNC: 343 MG/DL (ref ?–150)
VLDLC SERPL CALC-MCNC: 68.6 MG/DL

## 2022-03-18 PROBLEM — K85.90 ACUTE PANCREATITIS: Status: ACTIVE | Noted: 2021-11-17

## 2022-03-20 PROBLEM — K85.90 PANCREATITIS: Status: ACTIVE | Noted: 2021-11-17

## 2022-05-10 ENCOUNTER — TRANSCRIBE ORDER (OUTPATIENT)
Dept: SCHEDULING | Age: 42
End: 2022-05-10

## 2022-05-10 ENCOUNTER — HOSPITAL ENCOUNTER (OUTPATIENT)
Dept: LAB | Age: 42
Discharge: HOME | End: 2022-05-10

## 2022-05-10 DIAGNOSIS — N39.0 URINARY TRACT INFECTION, SITE NOT SPECIFIED: Primary | ICD-10-CM

## 2022-05-10 PROCEDURE — 87086 URINE CULTURE/COLONY COUNT: CPT

## 2022-05-12 LAB
BACTERIA SPEC CULT: NORMAL
SERVICE CMNT-IMP: NORMAL

## 2022-05-13 PROCEDURE — 80076 HEPATIC FUNCTION PANEL: CPT

## 2022-05-13 PROCEDURE — 80061 LIPID PANEL: CPT

## 2022-05-14 ENCOUNTER — HOSPITAL ENCOUNTER (OUTPATIENT)
Dept: LAB | Age: 42
Discharge: HOME | End: 2022-05-14

## 2022-05-14 LAB
ALBUMIN SERPL-MCNC: 4.2 G/DL (ref 3.5–5)
ALBUMIN/GLOB SERPL: 1.2 {RATIO} (ref 1.1–2.2)
ALP SERPL-CCNC: 58 U/L (ref 45–117)
ALT SERPL-CCNC: 22 U/L (ref 12–78)
AST SERPL-CCNC: 11 U/L (ref 15–37)
BILIRUB DIRECT SERPL-MCNC: <0.1 MG/DL (ref 0–0.2)
BILIRUB SERPL-MCNC: 0.2 MG/DL (ref 0.2–1)
CHOLEST SERPL-MCNC: 244 MG/DL
GLOBULIN SER CALC-MCNC: 3.6 G/DL (ref 2–4)
HDLC SERPL-MCNC: 40 MG/DL
HDLC SERPL: 6.1 {RATIO} (ref 0–5)
LDLC SERPL CALC-MCNC: 169.8 MG/DL (ref 0–100)
PROT SERPL-MCNC: 7.8 G/DL (ref 6.4–8.2)
TRIGL SERPL-MCNC: 171 MG/DL (ref ?–150)
VLDLC SERPL CALC-MCNC: 34.2 MG/DL

## 2022-05-16 ENCOUNTER — HOSPITAL ENCOUNTER (OUTPATIENT)
Dept: ULTRASOUND IMAGING | Age: 42
Discharge: HOME | End: 2022-05-16
Attending: INTERNAL MEDICINE
Payer: SUBSIDIZED

## 2022-05-16 DIAGNOSIS — N39.0 URINARY TRACT INFECTION, SITE NOT SPECIFIED: ICD-10-CM

## 2022-05-16 PROCEDURE — 76856 US EXAM PELVIC COMPLETE: CPT

## 2022-05-16 PROCEDURE — 76830 TRANSVAGINAL US NON-OB: CPT

## 2022-06-14 ENCOUNTER — HOSPITAL ENCOUNTER (OUTPATIENT)
Dept: LAB | Age: 42
Discharge: HOME | End: 2022-06-14

## 2022-06-14 PROCEDURE — 88175 CYTOPATH C/V AUTO FLUID REDO: CPT

## 2022-06-14 PROCEDURE — 87624 HPV HI-RISK TYP POOLED RSLT: CPT

## 2023-12-13 ENCOUNTER — TRANSCRIBE ORDERS (OUTPATIENT)
Facility: HOSPITAL | Age: 43
End: 2023-12-13

## 2023-12-13 DIAGNOSIS — Z12.31 SCREENING MAMMOGRAM FOR BREAST CANCER: Primary | ICD-10-CM

## 2024-01-08 ENCOUNTER — HOSPITAL ENCOUNTER (OUTPATIENT)
Facility: HOSPITAL | Age: 44
Discharge: HOME | End: 2024-01-11
Payer: SUBSIDIZED

## 2024-01-08 DIAGNOSIS — Z12.31 SCREENING MAMMOGRAM FOR BREAST CANCER: ICD-10-CM

## 2024-01-08 PROCEDURE — 77063 BREAST TOMOSYNTHESIS BI: CPT

## 2024-08-01 ENCOUNTER — HOSPITAL ENCOUNTER (OUTPATIENT)
Facility: HOSPITAL | Age: 44
Setting detail: SPECIMEN
Discharge: HOME | End: 2024-08-04

## 2024-08-01 PROCEDURE — 36415 COLL VENOUS BLD VENIPUNCTURE: CPT

## 2024-08-01 PROCEDURE — 80061 LIPID PANEL: CPT

## 2024-08-01 PROCEDURE — 85025 COMPLETE CBC W/AUTO DIFF WBC: CPT

## 2024-08-01 PROCEDURE — 84550 ASSAY OF BLOOD/URIC ACID: CPT

## 2024-08-01 PROCEDURE — 80053 COMPREHEN METABOLIC PANEL: CPT

## 2024-08-02 LAB
ALBUMIN SERPL-MCNC: 4.2 G/DL (ref 3.5–5)
ALBUMIN/GLOB SERPL: 1.3 (ref 1.1–2.2)
ALP SERPL-CCNC: 48 U/L (ref 45–117)
ALT SERPL-CCNC: 20 U/L (ref 12–78)
ANION GAP SERPL CALC-SCNC: 7 MMOL/L (ref 5–15)
AST SERPL-CCNC: 13 U/L (ref 15–37)
BASOPHILS # BLD: 0 K/UL (ref 0–0.1)
BASOPHILS NFR BLD: 1 % (ref 0–1)
BILIRUB SERPL-MCNC: 0.4 MG/DL (ref 0.2–1)
BUN SERPL-MCNC: 7 MG/DL (ref 6–20)
BUN/CREAT SERPL: 12 (ref 12–20)
CALCIUM SERPL-MCNC: 9.1 MG/DL (ref 8.5–10.1)
CHLORIDE SERPL-SCNC: 104 MMOL/L (ref 97–108)
CHOLEST SERPL-MCNC: 240 MG/DL
CO2 SERPL-SCNC: 27 MMOL/L (ref 21–32)
CREAT SERPL-MCNC: 0.6 MG/DL (ref 0.55–1.02)
DIFFERENTIAL METHOD BLD: ABNORMAL
EOSINOPHIL # BLD: 0.1 K/UL (ref 0–0.4)
EOSINOPHIL NFR BLD: 2 % (ref 0–7)
ERYTHROCYTE [DISTWIDTH] IN BLOOD BY AUTOMATED COUNT: 12.2 % (ref 11.5–14.5)
GLOBULIN SER CALC-MCNC: 3.2 G/DL (ref 2–4)
GLUCOSE SERPL-MCNC: 104 MG/DL (ref 65–100)
HCT VFR BLD AUTO: 35.7 % (ref 35–47)
HDLC SERPL-MCNC: 49 MG/DL
HDLC SERPL: 4.9 (ref 0–5)
HGB BLD-MCNC: 11.6 G/DL (ref 11.5–16)
IMM GRANULOCYTES # BLD AUTO: 0 K/UL (ref 0–0.04)
IMM GRANULOCYTES NFR BLD AUTO: 0 % (ref 0–0.5)
LDLC SERPL CALC-MCNC: 142 MG/DL (ref 0–100)
LYMPHOCYTES # BLD: 1.9 K/UL (ref 0.8–3.5)
LYMPHOCYTES NFR BLD: 31 % (ref 12–49)
MCH RBC QN AUTO: 30.6 PG (ref 26–34)
MCHC RBC AUTO-ENTMCNC: 32.5 G/DL (ref 30–36.5)
MCV RBC AUTO: 94.2 FL (ref 80–99)
MONOCYTES # BLD: 0.4 K/UL (ref 0–1)
MONOCYTES NFR BLD: 7 % (ref 5–13)
NEUTS SEG # BLD: 3.7 K/UL (ref 1.8–8)
NEUTS SEG NFR BLD: 59 % (ref 32–75)
NRBC # BLD: 0 K/UL (ref 0–0.01)
NRBC BLD-RTO: 0 PER 100 WBC
PLATELET # BLD AUTO: 284 K/UL (ref 150–400)
PMV BLD AUTO: 9.7 FL (ref 8.9–12.9)
POTASSIUM SERPL-SCNC: 3.9 MMOL/L (ref 3.5–5.1)
PROT SERPL-MCNC: 7.4 G/DL (ref 6.4–8.2)
RBC # BLD AUTO: 3.79 M/UL (ref 3.8–5.2)
SODIUM SERPL-SCNC: 138 MMOL/L (ref 136–145)
TRIGL SERPL-MCNC: 245 MG/DL
URATE SERPL-MCNC: 5 MG/DL (ref 2.6–6)
VLDLC SERPL CALC-MCNC: 49 MG/DL
WBC # BLD AUTO: 6.2 K/UL (ref 3.6–11)

## 2025-01-21 ENCOUNTER — HOSPITAL ENCOUNTER (OUTPATIENT)
Facility: HOSPITAL | Age: 45
Setting detail: SPECIMEN
Discharge: HOME | End: 2025-01-24

## 2025-01-21 PROCEDURE — 80061 LIPID PANEL: CPT

## 2025-01-21 PROCEDURE — 80053 COMPREHEN METABOLIC PANEL: CPT

## 2025-01-22 LAB
ALBUMIN SERPL-MCNC: 4.3 G/DL (ref 3.5–5)
ALBUMIN/GLOB SERPL: 1.2 (ref 1.1–2.2)
ALP SERPL-CCNC: 52 U/L (ref 45–117)
ALT SERPL-CCNC: 22 U/L (ref 12–78)
ANION GAP SERPL CALC-SCNC: 8 MMOL/L (ref 2–12)
AST SERPL-CCNC: 8 U/L (ref 15–37)
BILIRUB SERPL-MCNC: 0.6 MG/DL (ref 0.2–1)
BUN SERPL-MCNC: 5 MG/DL (ref 6–20)
BUN/CREAT SERPL: 7 (ref 12–20)
CALCIUM SERPL-MCNC: 9.3 MG/DL (ref 8.5–10.1)
CHLORIDE SERPL-SCNC: 104 MMOL/L (ref 97–108)
CHOLEST SERPL-MCNC: 251 MG/DL
CO2 SERPL-SCNC: 24 MMOL/L (ref 21–32)
CREAT SERPL-MCNC: 0.67 MG/DL (ref 0.55–1.02)
GLOBULIN SER CALC-MCNC: 3.7 G/DL (ref 2–4)
GLUCOSE SERPL-MCNC: 97 MG/DL (ref 65–100)
HDLC SERPL-MCNC: 57 MG/DL
HDLC SERPL: 4.4 (ref 0–5)
LDLC SERPL CALC-MCNC: 143 MG/DL (ref 0–100)
POTASSIUM SERPL-SCNC: 3.6 MMOL/L (ref 3.5–5.1)
PROT SERPL-MCNC: 8 G/DL (ref 6.4–8.2)
SODIUM SERPL-SCNC: 136 MMOL/L (ref 136–145)
TRIGL SERPL-MCNC: 255 MG/DL
VLDLC SERPL CALC-MCNC: 51 MG/DL